# Patient Record
Sex: MALE | Race: WHITE | NOT HISPANIC OR LATINO | URBAN - METROPOLITAN AREA
[De-identification: names, ages, dates, MRNs, and addresses within clinical notes are randomized per-mention and may not be internally consistent; named-entity substitution may affect disease eponyms.]

---

## 2018-09-21 ENCOUNTER — APPOINTMENT (OUTPATIENT)
Dept: PHYSICAL THERAPY | Facility: HOSPITAL | Age: 58
End: 2018-09-21

## 2018-09-24 ENCOUNTER — HOSPITAL ENCOUNTER (OUTPATIENT)
Dept: PHYSICAL THERAPY | Facility: HOSPITAL | Age: 58
Setting detail: THERAPIES SERIES
End: 2018-09-24

## 2018-09-26 ENCOUNTER — APPOINTMENT (OUTPATIENT)
Dept: PHYSICAL THERAPY | Facility: HOSPITAL | Age: 58
End: 2018-09-26

## 2018-10-01 ENCOUNTER — APPOINTMENT (OUTPATIENT)
Dept: PHYSICAL THERAPY | Facility: HOSPITAL | Age: 58
End: 2018-10-01

## 2018-10-02 ENCOUNTER — HOSPITAL ENCOUNTER (OUTPATIENT)
Dept: PHYSICAL THERAPY | Facility: HOSPITAL | Age: 58
Setting detail: THERAPIES SERIES
Discharge: HOME | End: 2018-10-02

## 2018-10-02 DIAGNOSIS — S72.001A CLOSED FRACTURE OF NECK OF RIGHT FEMUR, INITIAL ENCOUNTER (HCC): Primary | ICD-10-CM

## 2018-10-02 PROCEDURE — 97161 PT EVAL LOW COMPLEX 20 MIN: CPT

## 2018-10-02 NOTE — THERAPY DISCHARGE NOTE
Outpatient Physical Therapy Ortho Initial Evaluation/Discharge  ADE Kumar     Patient Name: Patel Gunn  : 1960  MRN: [id number]  Today's Date: 10/2/2018      Visit Date: 10/02/2018    There is no problem list on file for this patient.       No past medical history on file.     No past surgical history on file.      Visit Dx:     ICD-10-CM ICD-9-CM   1. Closed fracture of neck of right femur, initial encounter (CMS/Formerly [facility]) S72.001A 820.8             Patient History     Row Name 10/02/18 1300             History    Chief Complaint Difficulty with daily activities;Difficulty Walking;Joint stiffness;Muscle tenderness;Muscle weakness;Pain  -AS      Type of Pain Hip pain   Right  -AS      Date Current Problem(s) Began 18  -AS      Brief Description of Current Complaint Patient states that he was running and fell on the sidewalk on 18. He states that his girlfriend drove him to the ER. X-rays were taken that showed a right femur fracture. He was admitted to the hospital and underwent surguical repair of his fracture. He was in hospital 2-3 days and was D/C home with rolling walker. He has since transitioned to bilateral axillary crutches at this time.   -AS      Patient/Caregiver Goals Relieve pain;Return to prior level of function;Improve mobility;Improve strength  -AS      Patient's Rating of General Health Fair  -AS      Hand Dominance right-handed  -AS      Occupation/sports/leisure activities unemployeed  -AS      How has patient tried to help current problem? rest  -AS      What clinical tests have you had for this problem? X-ray  -AS      Results of Clinical Tests right femur fracture  -AS      Surgery/Hospitalization 18  -AS         Pain     Pain Location Hip  -AS      Pain at Present 0  -AS      Pain at Best 0  -AS      Pain at Worst 6  -AS      Pain Frequency Intermittent  -AS      Pain Description Aching  -AS      What Performance Factors Make the Current Problem(s) WORSE?  walking, standing  -AS      What Performance Factors Make the Current Problem(s) BETTER? rest  -AS         Daily Activities    Primary Language English  -AS      How does patient learn best? Listening;Reading  -AS      Teaching needs identified Home Exercise Program;Management of Condition  -AS      Patient is concerned about/has problems with Difficulty with self care (i.e. bathing, dressing, toileting:;Flexibility;Performing home management (household chores, shopping, care of dependents);Standing;Walking;Performing sports, recreation, and play activities  -AS      Does patient have problems with the following? None  -AS      Barriers to learning None  -AS      Pt Participated in POC and Goals Yes  -AS         Safety    Are you being hurt, hit, or frightened by anyone at home or in your life? No  -AS      Are you being neglected by a caregiver No  -AS        User Key  (r) = Recorded By, (t) = Taken By, (c) = Cosigned By    Initials Name Provider Type    AS Cliff Singh, PT Physical Therapist                PT Ortho     Row Name 10/02/18 1300       Precautions and Contraindications    Precautions/Limitations no known precautions/limitations  -AS       Posture/Observations    Posture- WNL Posture is WNL  -AS    Observations Incision healing  -AS       Right Lower Ext    Rt Hip ABduction AROM 50% limited  -AS    Rt Hip ADduction AROM WNL  -AS    Rt Hip Extension AROM 25% limited  -AS    Rt Hip Flexion AROM 50% limited   -AS    Rt Hip External Rotation AROM 25% limited  -AS    Rt Hip Internal Rotation AROM 25% limited  -AS       MMT Right Lower Ext    Rt Hip Flexion MMT, Gross Movement (4-/5) good minus  -AS    Rt Hip Extension MMT, Gross Movement (4-/5) good minus  -AS    Rt Hip ABduction MMT, Gross Movement (3+/5) fair plus  -AS    Rt Hip Internal (Medial) Rotation MMT, Gross Movement (4-/5) good minus  -AS    Rt Hip External (Lateral) Rotation MMT, Gross Movement (4-/5) good minus  -AS    Rt Knee Extension  MMT, Gross Movement (4-/5) good minus  -AS    Rt Knee Flexion MMT, Gross Movement (4+/5) good plus  -AS       Sensation    Sensation WNL? WNL  -AS    Light Touch No apparent deficits  -AS       Lower Extremity Flexibility    Hamstrings Right:;Mildly limited  -AS       Gait/Stairs Assessment/Training    Assistive Device (Gait) crutches, axillary  -AS    Comment (Gait/Stairs) Patient ambulates with a near normal gait pattern using bilateral axillary crutches at the time of his initial evaluation.  -AS      User Key  (r) = Recorded By, (t) = Taken By, (c) = Cosigned By    Initials Name Provider Type    AS Cliff Singh, PT Physical Therapist                       Therapy Education  Given: HEP, Symptoms/condition management, Pain management, Edema management, Mobility training  Program: New  How Provided: Verbal, Demonstration, Written  Provided to: Patient  Level of Understanding: Teach back education performed, Verbalized, Demonstrated          PT OP Goals     Row Name 10/02/18 1300          PT Short Term Goals    STG Date to Achieve 10/02/18  -AS     STG 1 Patient to demonstrate proper performance of his HEP prior to him leaving his initial evaluation.  -AS     STG 1 Progress Met  -AS       User Key  (r) = Recorded By, (t) = Taken By, (c) = Cosigned By    Initials Name Provider Type    AS Cliff Singh, PT Physical Therapist                PT Assessment/Plan     Row Name 10/02/18 1300          PT Assessment    Functional Limitations Impaired gait;Impaired locomotion;Limitation in home management;Limitations in community activities;Performance in leisure activities;Performance in self-care ADL;Performance in sport activities  -AS     Impairments Gait;Impaired flexibility;Muscle strength;Pain;Range of motion  -AS     Assessment Comments Patient presents to outpatient PT with complaints of right hip pain s/p ORIF of right femur fracture on 9-11-18. Patient ambulates well with bilateral axillary crutches  at time of initial evaluation. Patient presents with limited right hip AROM, limited right hip strength, and increased pain. Patient has limited function at this time due to the above. Patient requested to continue with PT at another facility closer to home.  -AS     Please refer to paper survey for additional self-reported information Yes  -AS     Rehab Potential Good  -AS     Patient/caregiver participated in establishment of treatment plan and goals Yes  -AS     Patient would benefit from skilled therapy intervention Yes  -AS        PT Plan    PT Frequency 2x/week  -AS     Predicted Duration of Therapy Intervention (Therapy Eval) 4-6 weeks  -AS     Planned CPT's? PT RE-EVAL: 63475;PT THER PROC EA 15 MIN: 09107;PT THER ACT EA 15 MIN: 86935;PT MANUAL THERAPY EA 15 MIN: 10307;PT NEUROMUSC RE-EDUCATION EA 15 MIN: 23200;PT GAIT TRAINING EA 15 MIN: 51895;PT ELECTRICAL STIM UNATTEND: ;PT ULTRASOUND EA 15 MIN: 22949;PT HOT/COLD PACK WC NONMCARE: 31504  -AS       User Key  (r) = Recorded By, (t) = Taken By, (c) = Cosigned By    Initials Name Provider Type    AS Cliff Singh, PT Physical Therapist                Exercises     Row Name 10/02/18 1300             Subjective Pain    Able to rate subjective pain? yes  -AS      Pre-Treatment Pain Level 0  -AS      Post-Treatment Pain Level 0  -AS         Exercise 1    Exercise Name 1 HS Stretch  -AS      Reps 1 10  -AS      Time 1 10 sec hold each  -AS         Exercise 2    Exercise Name 2 QS   -AS      Reps 2 25  -AS      Time 2 5 sec hold each  -AS         Exercise 3    Exercise Name 3 Supine Glute sets  -AS      Reps 3 25  -AS      Time 3 5 sec hold each  -AS         Exercise 4    Exercise Name 4 Supine Hip ADD vs Ball  -AS      Reps 4 25  -AS      Time 4 5 sec hold each  -AS         Exercise 5    Exercise Name 5 SAQ  -AS      Reps 5 25  -AS         Exercise 6    Exercise Name 6 SLR  -AS      Reps 6 25  -AS         Exercise 7    Exercise Name 7 Supine Clams  -AS       Reps 7 25  -AS      Additional Comments Blue  -AS         Exercise 8    Exercise Name 8 LAQ  -AS      Reps 8 25  -AS        User Key  (r) = Recorded By, (t) = Taken By, (c) = Cosigned By    Initials Name Provider Type    AS Cliff Singh, PT Physical Therapist                       Outcome Measure Options: Lower Extremity Functional Scale (LEFS)  Lower Extremity Functional Index  Any of your usual work, housework or school activities: Moderate difficulty  Your usual hobbies, recreational or sporting activities: Moderate difficulty  Getting into or out of the bath: Moderate difficulty  Walking between rooms: A little bit of difficulty  Putting on your shoes or socks: Moderate difficulty  Squatting: Quite a bit of difficulty  Lifting an object, like a bag of groceries from the floor: Moderate difficulty  Performing light activities around your home: Moderate difficulty  Performing heavy activities around your home: Extreme difficulty or unable to perform activity  Getting into or out of a car: Moderate difficulty  Walking 2 blocks: Extreme difficulty or unable to perform activity  Walking a mile: Extreme difficulty or unable to perform activity  Going up or down 10 stairs (about 1 flight of stairs): Quite a bit of difficulty  Standing for 1 hour: Extreme difficulty or unable to perform activity  Sitting for 1 hour: No difficulty  Running on even ground: Extreme difficulty or unable to perform activity  Running on uneven ground: Extreme difficulty or unable to perform activity  Making sharp turns while running fast: Extreme difficulty or unable to perform activity  Hopping: Extreme difficulty or unable to perform activity  Rolling over in bed: A little bit of difficulty  Total: 26      Time Calculation:   Start Time: 1209  Stop Time: 1300  Time Calculation (min): 51 min  Therapy Suggested Charges     Code   Minutes Charges    None           Therapy Charges for Today     Code Description Service Date  Service Provider Modifiers Qty    [id number]  PT EVAL LOW COMPLEXITY 3 10/2/2018 Cliff Singh, PT GP 1          PT G-Codes  Outcome Measure Options: Lower Extremity Functional Scale (LEFS)  Total: 26     OP PT Discharge Summary  Date of Discharge: 10/02/18  Reason for Discharge: All goals achieved  Outcomes Achieved: Able to achieve all goals within established timeline  Discharge Destination: Home with home program  Discharge Instructions/Additional Comments: Patient requested to continue with PT at another facility closer to home. He was given a HEP and instructed to perform this HEP 2x daily.        Cliff Singh, PT  10/2/2018